# Patient Record
Sex: FEMALE | Race: WHITE | Employment: FULL TIME | URBAN - METROPOLITAN AREA
[De-identification: names, ages, dates, MRNs, and addresses within clinical notes are randomized per-mention and may not be internally consistent; named-entity substitution may affect disease eponyms.]

---

## 2020-12-01 NOTE — PROGRESS NOTES
Subjective     Valerie Dupree is a 50 year old female who presents to clinic today for the following health issues:    HPI         Musculoskeletal problem/pain- right knee pain. 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  Onset/Duration:10/20/20  Description/  Location: knee - right  Joint Swelling: no  Redness: YES  Pain: YES  Warmth: no  Intensity:  moderate  Progression of Symptoms:  worsening  Accompanying signs and symptoms:   Fevers: no  Numbness/tingling/weakness: no  History  Trauma to the area: YES, knee hit the ground when she slipped  Recent illness:  no  Previous similar problem: no  Previous evaluation:  no  Precipitating or alleviating factors:  Aggravating factors include: bending position for a long period of time  Therapies tried and outcome: ice, ibuprofen and elevation with little relief    Patient states after fall at work, her her right wrist was painful however this is improved. Her right knee was ok but then started to have increased pain. States is painful with ROM or if she sits crossed leg. Feels like it is giving out especially after sitting for long periods.           Review of Systems   Constitutional, HEENT, cardiovascular, pulmonary, GI, , musculoskeletal, neuro, skin, endocrine and psych systems are negative, except as otherwise noted.      Objective    /64   Pulse 72   Temp 98  F (36.7  C) (Temporal)   Wt 68.9 kg (152 lb)   SpO2 99%   There is no height or weight on file to calculate BMI.  Physical Exam   GENERAL: healthy, alert and no distress  MS: Knee Exam: Inspection: small effusion, No quad atrophy  Tender: MCL, medial joint line  Active Range of Motion: full flexion, no pain with flexion, full extension, pain with extension, Patellofemoral crepitus with extension  Strength: quad  5/5, Hamstrings  5/5 and Gastroc  5/5  Special tests: positive Valgus stress test, negative Lachman's test, negative posterior drawer, negative Francisco's , no  apprehension with lateral stress of the patella   SKIN: no suspicious lesions or rashes          Assessment & Plan     Acute pain of right knee    - PHYSICAL THERAPY REFERRAL; Future    Instability of medial collateral ligament of knee  Recommend patient follow-up with physical therapy for evaluation and assessment in regards to strengthening of right medial knee.  Muscle tone seems normal with evaluation today patient has full range of motion there is mild instability of the MCL.  - PHYSICAL THERAPY REFERRAL; Future    Right wrist pain  Stable no pain at visit.           Patient Instructions   Please follow up with PT as discussed they will call you to schedule this appointment.     Recommending; ace wrap for compression and stability, elevation after activity and ice massage as discussed.     Ibuprofen as needed for discomfort.     Follow up in 6 week.     Thank you  Cathie Gold [facility]

## 2020-12-02 ENCOUNTER — OFFICE VISIT (OUTPATIENT)
Dept: FAMILY MEDICINE | Facility: CLINIC | Age: 50
End: 2020-12-02
Payer: OTHER MISCELLANEOUS

## 2020-12-02 VITALS
WEIGHT: 152 LBS | DIASTOLIC BLOOD PRESSURE: 64 MMHG | SYSTOLIC BLOOD PRESSURE: 110 MMHG | HEART RATE: 72 BPM | OXYGEN SATURATION: 99 % | TEMPERATURE: 98 F

## 2020-12-02 DIAGNOSIS — M25.531 RIGHT WRIST PAIN: ICD-10-CM

## 2020-12-02 DIAGNOSIS — M23.8X9 INSTABILITY OF MEDIAL COLLATERAL LIGAMENT OF KNEE: ICD-10-CM

## 2020-12-02 DIAGNOSIS — M25.561 ACUTE PAIN OF RIGHT KNEE: Primary | ICD-10-CM
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
## 2020-12-02 PROBLEM — F32.89 OTHER SPECIFIED DEPRESSIVE EPISODES: Status: ACTIVE | Noted: 2019-08-12

## 2020-12-02 PROBLEM — Z91.89 RISK FOR CORONARY ARTERY DISEASE LESS THAN 10% IN NEXT 10 YEARS: Status: ACTIVE | Noted: 2019-07-19

## 2020-12-02 PROCEDURE — 99203 OFFICE O/P NEW LOW 30 MIN: CPT | Performed by: NURSE PRACTITIONER

## 2020-12-02 RX ORDER — IBUPROFEN 200 MG
400 TABLET ORAL
COMMUNITY
Start: 2020-06-19

## 2020-12-02 RX ORDER — MULTIPLE VITAMINS W/ MINERALS TAB 9MG-400MCG
1 TAB ORAL
COMMUNITY

## 2020-12-02 NOTE — PATIENT INSTRUCTIONS
Please follow up with PT as discussed they will call you to schedule this appointment.     Recommending; ace wrap for compression and stability, elevation after activity and ice massage as discussed.     Ibuprofen as needed for discomfort.     Follow up in 6 week.     Thank you  Cathie Gold CNP

## 2020-12-08 ENCOUNTER — TELEPHONE (OUTPATIENT)
Dept: FAMILY MEDICINE | Facility: OTHER | Age: 50
End: 2020-12-08

## 2020-12-08 NOTE — LETTER
[facility] [place]    [address]   [place]. [address]   Phone: [phone]      REPORT OF WORK ABILITY    NOTE TO EMPLOYEE: You must promptly provide a copy of this report to your  employer or worker's compensation insurer, and Qualified Rehabilitation Consultant.    Date: 12/8/2020                     Employee Name: Valerie Dupree         YOB: 1970  Medical Record Number: [id number]   Soc.Sec.No: [id number]  Employer:                 Date of Injury: 10/20/2020   Managed Care Organization / Insurance Company Name: UNKNOWN    Diagnosis: Right medial collateral ligament strain.   Work Related: yes     MMI: fall slipped on wet floor   Permanent Partial Disability(PPD) likely: NO    EMPLOYEE IS ABLE TO WORK: unrestricted as of 12/8/2020      RESTRICTIONS IF ANY: None    OTHER RESTRICTIONS: None    TREATMENT PLAN/NOTES: continue PT as recommended by therapist.      Cathie Gold CNP

## 2020-12-08 NOTE — TELEPHONE ENCOUNTER
PT was in on 12/2/20 to see WS for a wc appt. Her employer needs a workability letter/form. Pt states that she is fine and she can work with no restrictions. Please email the letter/form [email or web address] and call when this is completed.

## 2020-12-11 ENCOUNTER — THERAPY VISIT (OUTPATIENT)
Dept: PHYSICAL THERAPY | Facility: CLINIC | Age: 50
End: 2020-12-11
Attending: NURSE PRACTITIONER
Payer: OTHER MISCELLANEOUS

## 2020-12-11 DIAGNOSIS — M25.561 ACUTE PAIN OF RIGHT KNEE: ICD-10-CM

## 2020-12-11 DIAGNOSIS — M23.8X9 INSTABILITY OF MEDIAL COLLATERAL LIGAMENT OF KNEE: ICD-10-CM

## 2020-12-11 PROCEDURE — 97161 PT EVAL LOW COMPLEX 20 MIN: CPT | Mod: GP | Performed by: PHYSICAL THERAPIST

## 2020-12-11 PROCEDURE — 97140 MANUAL THERAPY 1/> REGIONS: CPT | Mod: GP | Performed by: PHYSICAL THERAPIST

## 2020-12-11 PROCEDURE — 97110 THERAPEUTIC EXERCISES: CPT | Mod: GP | Performed by: PHYSICAL THERAPIST

## 2020-12-11 PROCEDURE — 97035 APP MDLTY 1+ULTRASOUND EA 15: CPT | Mod: GP | Performed by: PHYSICAL THERAPIST

## 2020-12-11 ASSESSMENT — ACTIVITIES OF DAILY LIVING (ADL)
STAND: ACTIVITY IS MINIMALLY DIFFICULT
GIVING WAY, BUCKLING OR SHIFTING OF KNEE: I HAVE THE SYMPTOM BUT IT DOES NOT AFFECT MY ACTIVITY
SWELLING: I HAVE THE SYMPTOM BUT IT DOES NOT AFFECT MY ACTIVITY
AS_A_RESULT_OF_YOUR_KNEE_INJURY,_HOW_WOULD_YOU_RATE_YOUR_CURRENT_LEVEL_OF_DAILY_ACTIVITY?: NEARLY NORMAL
HOW_WOULD_YOU_RATE_THE_OVERALL_FUNCTION_OF_YOUR_KNEE_DURING_YOUR_USUAL_DAILY_ACTIVITIES?: NEARLY NORMAL
WALK: ACTIVITY IS SOMEWHAT DIFFICULT
STIFFNESS: I HAVE THE SYMPTOM BUT IT DOES NOT AFFECT MY ACTIVITY
LIMPING: I HAVE THE SYMPTOM BUT IT DOES NOT AFFECT MY ACTIVITY
SIT WITH YOUR KNEE BENT: ACTIVITY IS SOMEWHAT DIFFICULT
WEAKNESS: I HAVE THE SYMPTOM BUT IT DOES NOT AFFECT MY ACTIVITY
GO UP STAIRS: ACTIVITY IS SOMEWHAT DIFFICULT
SQUAT: ACTIVITY IS SOMEWHAT DIFFICULT
KNEEL ON THE FRONT OF YOUR KNEE: ACTIVITY IS SOMEWHAT DIFFICULT
PAIN: THE SYMPTOM AFFECTS MY ACTIVITY SLIGHTLY
KNEE_ACTIVITY_OF_DAILY_LIVING_SUM: 50
RAW_SCORE: 50
HOW_WOULD_YOU_RATE_THE_CURRENT_FUNCTION_OF_YOUR_KNEE_DURING_YOUR_USUAL_DAILY_ACTIVITIES_ON_A_SCALE_FROM_0_TO_100_WITH_100_BEING_YOUR_LEVEL_OF_KNEE_FUNCTION_PRIOR_TO_YOUR_INJURY_AND_0_BEING_THE_INABILITY_TO_PERFORM_ANY_OF_YOUR_USUAL_DAILY_ACTIVITIES?: 70
KNEE_ACTIVITY_OF_DAILY_LIVING_SCORE: 71.43
RISE FROM A CHAIR: ACTIVITY IS MINIMALLY DIFFICULT
GO DOWN STAIRS: ACTIVITY IS MINIMALLY DIFFICULT

## 2020-12-11 NOTE — PROGRESS NOTES
[facility] Initial Evaluation  Subjective:  The history is provided by the patient. No  was used.   Patient Health History  Valerie Dupree being seen for right knee pain .     Problem began: 10/11/2020.   Problem occurred: slip and fall    Pain is reported as 5/10 on pain scale.  General health as reported by patient is good.  Pertinent medical history includes: cancer.   Red flags:  None as reported by patient.  Medical allergies: other. Other medical allergies details: amoxicillin .   Surgeries include:  Cancer surgery. Other surgery history details: melanoma .    Current medications:  Anti-depressants.    Current occupation is teacher.   Primary job tasks include:  Prolonged sitting, prolonged standing and computer work.                  Therapist Generated HPI Evaluation  Problem details: Slipped and fell when walking backward  and landed directly on R knee. Gotten worse since October-sitting more frequently. Pain with going up stairs, 0/10 with sitting currently, if sitting for 15-20 minutes starts to feel pain 5/10 is the worst. Sitting is worse than activity ie walking-45 minutes with no pain standing    .         Type of problem:  Right knee.    This is a new condition.  Condition occurred with:  A fall/slip.  Where condition occurred: at work.  Patient reports pain:  Medial and anterior (hurt on front of knee with stairs ).  Pain is described as aching and is intermittent.  Radiates to: no radiation  Pain timing: depends with activity   Since onset symptoms are gradually worsening.  Associated symptoms:  Buckling/giving out and loss of strength. Symptoms are exacerbated by ascending stairs, bending/squatting, walking, kneeling and sitting (can kneel for a little bit before pain sets on )  and relieved by activity/movement and NSAID's.    There was none improvement following previous treatment.  Restrictions due to condition include:  Working in normal job without  restrictions.  Barriers include:  None as reported by patient.           Knee Activity of Daily Living Score: 71.43            Objective:  Valerie Dupree , : 1970, MRN: [id number]    Physical Therapy Objective Findings  Subjective information, goals, clinical impression, daily documentation and other information found in EPISODES tab.  Knee Objective Findings    Posture: increased lordosis   Pain with squat functional squat to 60 degrees with anterior knee pain     Hip Screen: WNL  Range of Motion:                                    Right AROM            Right PROM Left AROM              Left PROM                Extension WNL - feels beetter with activation   WNL    Flexion WNL- some tightness on anterior knee  WNL    Other:         Manual Muscle Testing  (graded 0-5, measured at 0 degrees unless otherwise noted):                                                                       Right                                                  Left                                                      Flexion 4        - pain on anterior knee  4   Extension 4 4   Hip Abduction 4- 4-   Quad Set 4+ 4+   VMO     Other:  Hip flexion 4R with pain anterior knee 4 + L      (+ mild pain, ++ moderate pain, +++ severe pain)    Special Tests:                                                                    Right                                                   Left                                                      Step Test Height           -Control Comment     Functional Squat (deg.) 60 degrees    Lachmans neg    Posterior Sag neg    Anterior Drawer neg    Posterior Drawer     Varus at 0 & 30     Valgus at 0 & 30 positive    Francisco's negative    Apley's Distraction     Apley's Compression     External Rotation Test     OTHER:  Knee hyperflexion  Knee hyperextension   Neg  neg        System    Physical Exam    General     ROS    Assessment/Plan:    Patient is a 50 year old female with right side knee complaints.    Patient  has the following significant findings with corresponding treatment plan.                Diagnosis 1:  Right knee pain consistent with grade II MCL sprain   Pain -  US, manual therapy, splint/taping/bracing/orthotics, self management, education and home program  Decreased strength - therapeutic exercise, therapeutic activities and home program  Impaired muscle performance - neuro re-education and home program  Decreased function - therapeutic activities and home program    Therapy Evaluation Codes:   1) History comprised of:   Personal factors that impact the plan of care:      None.    Comorbidity factors that impact the plan of care are:      None.     Medications impacting care: Anti-depressant.  2) Examination of Body Systems comprised of:   Body structures and functions that impact the plan of care:      Knee.   Activity limitations that impact the plan of care are:      Bending and Sitting.  3) Clinical presentation characteristics are:   Stable/Uncomplicated.  4) Decision-Making    Low complexity using standardized patient assessment instrument and/or measureable assessment of functional outcome.  Cumulative Therapy Evaluation is: Low complexity.    Previous and current functional limitations:  (See Goal Flow Sheet for this information)    Short term and Long term goals: (See Goal Flow Sheet for this information)     Communication ability:  Patient appears to be able to clearly communicate and understand verbal and written communication and follow directions correctly.  Treatment Explanation - The following has been discussed with the patient:   RX ordered/plan of care  Anticipated outcomes  Possible risks and side effects  This patient would benefit from PT intervention to resume normal activities.   Rehab potential is excellent.    Frequency:  1 X week, once daily  Duration:  for 6 weeks  Discharge Plan:  Achieve all LTG.  Independent in home treatment program.  Reach maximal therapeutic benefit.    Please  refer to the daily flowsheet for treatment today, total treatment time and time spent performing 1:1 timed codes.     Terrell Holman, SPT, Jareth Nassar, DPT OCS

## 2020-12-11 NOTE — LETTER
[facility]  [address]  [facility] [id number] [phone]    2020    Re: Valerie Dupree   :   1970  MRN:  [id number]   REFERRING PHYSICIAN:   Cathie Gold    [facility]    Date of Initial Evaluation: 2020  Visits:  Rxs Used: 1  Reason for Referral:     Acute pain of right knee  Instability of medial collateral ligament of knee    EVALUATION SUMMARY    [facility] Initial Evaluation  Subjective:  The history is provided by the patient. No  was used.   Patient Health History  Valerie Dupree being seen for right knee pain .   Problem began: 10/11/2020.   Problem occurred: slip and fall    Pain is reported as 5/10 on pain scale.  General health as reported by patient is good.  Pertinent medical history includes: cancer.   Red flags:  None as reported by patient.  Medical allergies: other. Other medical allergies details: amoxicillin .   Surgeries include:  Cancer surgery. Other surgery history details: melanoma .    Current medications:  Anti-depressants.    Current occupation is teacher.   Primary job tasks include:  Prolonged sitting, prolonged standing and computer work.            Therapist Generated HPI Evaluation  Problem details: Slipped and fell when walking backward  and landed directly on R knee. Gotten worse since October-sitting more frequently. Pain with going up stairs, 0/10 with sitting currently, if sitting for 15-20 minutes starts to feel pain 5/10 is the worst. Sitting is worse than activity ie walking-45 minutes with no pain standing        Type of problem:  Right knee.  This is a new condition.  Condition occurred with:  A fall/slip.  Where condition occurred: at work.  Patient reports pain:  Medial and anterior (hurt on front of knee with stairs ).  Pain is described as aching and is intermittent.  Radiates to:  no radiation  Pain timing: depends with activity   Since onset symptoms are gradually worsening.  Associated symptoms:  Buckling/giving out and loss of strength. Symptoms are exacerbated by ascending stairs, bending/squatting, walking, kneeling and sitting (can kneel for a little bit before pain sets on )  and relieved by activity/movement and NSAID's.  Re: Valerie WILLARD Leia   :   1970, page 2    There was none improvement following previous treatment.  Restrictions due to condition include:  Working in normal job without restrictions.  Barriers include:  None as reported by patient.         Knee Activity of Daily Living Score: 71.43            Knee Objective Findings    Posture: increased lordosis   Pain with squat functional squat to 60 degrees with anterior knee pain     Hip Screen: WNL  Range of Motion:                                    Right AROM            Right PROM Left AROM              Left PROM                Extension WNL - feels beetter with activation   WNL    Flexion WNL- some tightness on anterior knee  WNL    Other:         Manual Muscle Testing  (graded 0-5, measured at 0 degrees unless otherwise noted):                                                                       Right                                                  Left                                                      Flexion 4        - pain on anterior knee  4   Extension 4 4   Hip Abduction 4- 4-   Quad Set 4+ 4+   VMO     Other:  Hip flexion 4R with pain anterior knee 4 + L      (+ mild pain, ++ moderate pain, +++ severe pain)                        Re: Valerie Dupree   :   1970, page 3  Special Tests:                                                                    Right                                                   Left                                                      Step Test Height           -Control Comment     Functional Squat (deg.) 60 degrees    Lachmans neg    Posterior Sag neg    Anterior Drawer neg     Posterior Drawer     Varus at 0 & 30     Valgus at 0 & 30 positive    Francisco's negative    Apley's Distraction     Apley's Compression     External Rotation Test     OTHER:  Knee hyperflexion  Knee hyperextension   Neg  neg      Assessment/Plan:    Patient is a 50 year old female with right side knee complaints.    Patient has the following significant findings with corresponding treatment plan.                Diagnosis 1:  Right knee pain consistent with grade II MCL sprain   Pain -  US, manual therapy, splint/taping/bracing/orthotics, self management, education and home program  Decreased strength - therapeutic exercise, therapeutic activities and home program  Impaired muscle performance - neuro re-education and home program  Decreased function - therapeutic activities and home program    Therapy Evaluation Codes:   1) History comprised of:   Personal factors that impact the plan of care:      None.    Comorbidity factors that impact the plan of care are:      None.     Medications impacting care: Anti-depressant.  2) Examination of Body Systems comprised of:   Body structures and functions that impact the POC: Knee.     Activity limitations that impact the plan of care are:      Bending and Sitting.  3) Clinical presentation characteristics are:   Stable/Uncomplicated.  4) Decision-Making    Low complexity using standardized patient assessment instrument and/or measureable assessment of functional outcome.  Cumulative Therapy Evaluation is: Low complexity.    Previous and current functional limitations:  (See Goal Flow Sheet for this information)    Short term and Long term goals: (See Goal Flow Sheet for this information)   Communication ability:  Patient appears to be able to clearly communicate and understand verbal and written communication and follow directions correctly.  Treatment Explanation - The following has been discussed with the patient:   RX ordered/plan of care  Anticipated outcomes. Possible  risks and side effects.  This patient would benefit from PT intervention to resume normal activities.   Rehab potential is excellent.    Frequency:  1 X week, once daily  Duration:  for 6 weeks  Discharge Plan:  Achieve all LTG.  Independent in home treatment program.  Reach maximal therapeutic benefit.    Terrell Holman, SPT/ Jareth Nassar, PT, DPT, OCS    Thank you for your referral.    INQUIRIES  Therapist: Jareth Nassar, PT, DPT, OCS   [facility]  [address] [place] [address] [id number]  [facility] [address]  Phone: [phone]  Fax: [phone]

## 2020-12-15 ENCOUNTER — THERAPY VISIT (OUTPATIENT)
Dept: PHYSICAL THERAPY | Facility: CLINIC | Age: 50
End: 2020-12-15
Payer: OTHER MISCELLANEOUS

## 2020-12-15 DIAGNOSIS — M25.561 ACUTE PAIN OF RIGHT KNEE: ICD-10-CM

## 2020-12-15 PROCEDURE — 97110 THERAPEUTIC EXERCISES: CPT | Mod: GP | Performed by: PHYSICAL THERAPY ASSISTANT

## 2020-12-15 PROCEDURE — 97035 APP MDLTY 1+ULTRASOUND EA 15: CPT | Mod: GP | Performed by: PHYSICAL THERAPY ASSISTANT

## 2020-12-22 ENCOUNTER — THERAPY VISIT (OUTPATIENT)
Dept: PHYSICAL THERAPY | Facility: CLINIC | Age: 50
End: 2020-12-22
Payer: OTHER MISCELLANEOUS

## 2020-12-22 DIAGNOSIS — M25.561 ACUTE PAIN OF RIGHT KNEE: ICD-10-CM

## 2020-12-22 PROCEDURE — 97035 APP MDLTY 1+ULTRASOUND EA 15: CPT | Mod: GP | Performed by: PHYSICAL THERAPY ASSISTANT

## 2020-12-22 PROCEDURE — 97110 THERAPEUTIC EXERCISES: CPT | Mod: GP | Performed by: PHYSICAL THERAPY ASSISTANT

## 2020-12-22 PROCEDURE — 97140 MANUAL THERAPY 1/> REGIONS: CPT | Mod: GP | Performed by: PHYSICAL THERAPY ASSISTANT

## 2021-01-06 ENCOUNTER — THERAPY VISIT (OUTPATIENT)
Dept: PHYSICAL THERAPY | Facility: CLINIC | Age: 51
End: 2021-01-06
Payer: OTHER MISCELLANEOUS

## 2021-01-06 DIAGNOSIS — M25.561 ACUTE PAIN OF RIGHT KNEE: ICD-10-CM

## 2021-01-06 PROCEDURE — 97110 THERAPEUTIC EXERCISES: CPT | Mod: GP | Performed by: PHYSICAL THERAPY ASSISTANT

## 2021-01-06 ASSESSMENT — ACTIVITIES OF DAILY LIVING (ADL)
KNEEL ON THE FRONT OF YOUR KNEE: ACTIVITY IS NOT DIFFICULT
KNEE_ACTIVITY_OF_DAILY_LIVING_SUM: 66
STAND: ACTIVITY IS NOT DIFFICULT
HOW_WOULD_YOU_RATE_THE_CURRENT_FUNCTION_OF_YOUR_KNEE_DURING_YOUR_USUAL_DAILY_ACTIVITIES_ON_A_SCALE_FROM_0_TO_100_WITH_100_BEING_YOUR_LEVEL_OF_KNEE_FUNCTION_PRIOR_TO_YOUR_INJURY_AND_0_BEING_THE_INABILITY_TO_PERFORM_ANY_OF_YOUR_USUAL_DAILY_ACTIVITIES?: 95
STIFFNESS: I HAVE THE SYMPTOM BUT IT DOES NOT AFFECT MY ACTIVITY
WEAKNESS: I HAVE THE SYMPTOM BUT IT DOES NOT AFFECT MY ACTIVITY
SWELLING: I DO NOT HAVE THE SYMPTOM
RAW_SCORE: 66
SQUAT: ACTIVITY IS MINIMALLY DIFFICULT
GIVING WAY, BUCKLING OR SHIFTING OF KNEE: I DO NOT HAVE THE SYMPTOM
GO UP STAIRS: ACTIVITY IS NOT DIFFICULT
GO DOWN STAIRS: ACTIVITY IS MINIMALLY DIFFICULT
SIT WITH YOUR KNEE BENT: ACTIVITY IS NOT DIFFICULT
LIMPING: I DO NOT HAVE THE SYMPTOM
KNEE_ACTIVITY_OF_DAILY_LIVING_SCORE: 94.29
RISE FROM A CHAIR: ACTIVITY IS NOT DIFFICULT
HOW_WOULD_YOU_RATE_THE_OVERALL_FUNCTION_OF_YOUR_KNEE_DURING_YOUR_USUAL_DAILY_ACTIVITIES?: NEARLY NORMAL
AS_A_RESULT_OF_YOUR_KNEE_INJURY,_HOW_WOULD_YOU_RATE_YOUR_CURRENT_LEVEL_OF_DAILY_ACTIVITY?: NORMAL
WALK: ACTIVITY IS NOT DIFFICULT
PAIN: I DO NOT HAVE THE SYMPTOM

## 2021-01-06 NOTE — PROGRESS NOTES
DISCHARGE REPORT    Progress reporting period is from 12/11/20 to 1/6/21.      SUBJECTIVE  Subjective changes noted by patient:   Pt has been seen for 4 PT sessions reporting 95% improved with daily function. Currently no pain complaints. Pt has been consistent with HEP and also has started fitness video workouts with good tolerance. Pt able to sit as long as needed without pain. Will have intermittent soreness with descending stairs. Pt pleased with benefit of therapy.    Current Pain level: 0/10    Initial Pain level: 5/10   Changes in function:  Yes (See Goal flowsheet attached for changes in current functional level)     Adverse reaction to treatment or activity: None     OBJECTIVE  Changes noted in objective findings:   R knee AROM: 2-0-139 deg.   MMT R knee flex, ext and R hip abd 5/5.   No tenderness with palpation to R knee.   Good technique with squat performance and exercise progression.   Knee ADLS has improved from 71%-94%.          ASSESSMENT/PLAN  Updated problem list and treatment plan: Diagnosis 1:  R knee pain  Decreased strength - home program  Decreased function - home program  STG/LTGs have been met:  Yes (See Goal flow sheet completed today.)  Progress toward STG/LTGs have been made:  Yes (See Goal flow sheet completed today.)  Assessment of Progress: The patient's condition is improving.  The patient has met all of their long term goals.  Self Management Plans:  Patient is independent in a home treatment program.  Patient is independent in self management of symptoms.  I have re-evaluated this patient and find that the nature, scope, duration and intensity of the therapy is appropriate for the medical condition of the patient.  Valerie continues to require the following intervention to meet STG and LT's:  PT intervention is no longer required to meet STG/LTG.    Recommendations:  This patient is ready to be discharged from therapy and continue their home treatment program.  The progress  note/discharge summary was written in collaboration with and reviewed by the physical therapist.    Please refer to the daily flowsheet for treatment today, total treatment time and time spent performing 1:1 timed codes.      Jareth Nassar,PT, DPT, OCS

## 2021-01-07 PROBLEM — M25.561 ACUTE PAIN OF RIGHT KNEE: Status: RESOLVED | Noted: 2020-12-02 | Resolved: 2021-01-07

## 2021-01-15 ENCOUNTER — HEALTH MAINTENANCE LETTER (OUTPATIENT)
Age: 51
End: 2021-01-15

## 2021-09-04 ENCOUNTER — HEALTH MAINTENANCE LETTER (OUTPATIENT)
Age: 51
End: 2021-09-04

## 2021-10-28 ENCOUNTER — TELEPHONE (OUTPATIENT)
Dept: FAMILY MEDICINE | Facility: CLINIC | Age: 51
End: 2021-10-28

## 2021-11-02 NOTE — TELEPHONE ENCOUNTER
Patient Quality Outreach Summary      Summary:    Patient is due/failing the following:   Physical, PAP(with fasting labs), colonoscopy and mammogram     Type of outreach:    Phone, left message for patient/parent to call back.  My chart letter sent   Questions for provider review:    None                                                                                                                    Galina Montes [facility]       Chart routed to Care Team.

## 2022-02-19 ENCOUNTER — HEALTH MAINTENANCE LETTER (OUTPATIENT)
Age: 52
End: 2022-02-19

## 2022-10-22 ENCOUNTER — HEALTH MAINTENANCE LETTER (OUTPATIENT)
Age: 52
End: 2022-10-22

## 2023-04-01 ENCOUNTER — HEALTH MAINTENANCE LETTER (OUTPATIENT)
Age: 53
End: 2023-04-01

## 2024-06-02 ENCOUNTER — HEALTH MAINTENANCE LETTER (OUTPATIENT)
Age: 54
End: 2024-06-02